# Patient Record
Sex: FEMALE | Race: WHITE | NOT HISPANIC OR LATINO | URBAN - METROPOLITAN AREA
[De-identification: names, ages, dates, MRNs, and addresses within clinical notes are randomized per-mention and may not be internally consistent; named-entity substitution may affect disease eponyms.]

---

## 2020-02-17 ENCOUNTER — OFFICE VISIT (OUTPATIENT)
Dept: PHYSICAL THERAPY | Facility: REHABILITATION | Age: 65
End: 2020-02-17

## 2020-02-17 DIAGNOSIS — G35 MS (MULTIPLE SCLEROSIS) (HCC): Primary | ICD-10-CM

## 2020-02-20 ENCOUNTER — OFFICE VISIT (OUTPATIENT)
Dept: PHYSICAL THERAPY | Facility: REHABILITATION | Age: 65
End: 2020-02-20
Payer: COMMERCIAL

## 2020-02-20 DIAGNOSIS — G35 MS (MULTIPLE SCLEROSIS) (HCC): Primary | ICD-10-CM

## 2020-02-20 PROCEDURE — 97162 PT EVAL MOD COMPLEX 30 MIN: CPT | Performed by: PHYSICAL THERAPIST

## 2020-02-20 PROCEDURE — 97112 NEUROMUSCULAR REEDUCATION: CPT | Performed by: PHYSICAL THERAPIST

## 2020-02-20 RX ORDER — FLUTICASONE PROPIONATE 50 MCG
1 SPRAY, SUSPENSION (ML) NASAL DAILY
COMMUNITY

## 2020-02-20 RX ORDER — ROPINIROLE 0.5 MG/1
0.5 TABLET, FILM COATED ORAL AS NEEDED
COMMUNITY

## 2020-02-20 RX ORDER — ROPINIROLE 2 MG/1
2 TABLET, FILM COATED ORAL
COMMUNITY

## 2020-02-20 NOTE — PROGRESS NOTES
PT Evaluation     Today's date: 2020  Patient name: Sarah Toscano  : 1955  MRN: [id number]  Referring provider: No ref  provider found  Dx:   Encounter Diagnosis     ICD-10-CM    1  MS (multiple sclerosis) ([facility] [address] ) G35                   Assessment/Plan    Subjective Evaluation    History of Present Illness  Mechanism of injury: Pt is a 59year old female presenting to PT with 4 month history of worsening thoracolumbar symptoms  Pt with history of MS which was diagnosed in , as relapsing-remitting  Pt reports she no         Pain Location:  Central and bilateral thoracolumbar spine; radiation into right lateral thigh, lower leg and lateral dorsal foot region  Pain Type: varies, aching at rest, sharp with motion; "zinger" of tingling into RLE with certain motions    Pain Intensity:  Current: 0  Best: 0  Worst: 4      Pt reports increased pain and/or difficulty with: sitting on softer surfaces without support, getting in/out of the car, extended standing  Pt reports sleep disturbance secondary to pain waking 2-3 times/week  Pt reports decreased pain with: Biofreeze, Deep Blue oil, TENS, heat or ice              Recurrent probem    Quality of life: good          Objective           Precautions: MS     Daily Treatment Diary      Assessment                       Eval/Reval  MD                     FOTO         **         **   POC Signed                       HEP Issued                        Manuals                                               Exercise Diary                         Bike  L1 x 5'                      Hamstring Stretch - longsit  30"x3                      SKTC  30"x3                      Piriformis Stretch  30"x3                      PPT  5"x10                      PPT + iso hip ADD  5"x10                      PPT + iso hip flex  5"x10                      PPT + low bridge  5"x10                      Clamshells  5"x10                      Reverse Clamshells  5"x10 Modalities  2/20                      PRN                          * = HEP

## 2020-03-02 ENCOUNTER — OFFICE VISIT (OUTPATIENT)
Dept: PHYSICAL THERAPY | Facility: REHABILITATION | Age: 65
End: 2020-03-02
Payer: COMMERCIAL

## 2020-03-02 DIAGNOSIS — G35 MS (MULTIPLE SCLEROSIS) (HCC): Primary | ICD-10-CM

## 2020-03-02 PROCEDURE — 97112 NEUROMUSCULAR REEDUCATION: CPT | Performed by: PHYSICAL THERAPIST

## 2020-03-02 PROCEDURE — 97110 THERAPEUTIC EXERCISES: CPT | Performed by: PHYSICAL THERAPIST

## 2020-03-02 NOTE — PROGRESS NOTES
Daily Note     Today's date: 3/2/2020  Patient name: Cindy Taveras  : 1955  MRN: [id number]  Referring provider: No ref  provider found  Dx:   Encounter Diagnosis     ICD-10-CM    1  MS (multiple sclerosis) ([facility] [address] ) G35                   Subjective: Pt reports decreased right leg "zingers", however, complains of slight low back discomfort with performance of HEP  She states she is unsure if she is performing it correctly  Objective: See treatment diary below      Assessment: Pt with good tolerance to progression of program reporting mild fatigue without increase in pain with completion of session  Pt was performing PPT incorrectly with upper torso compensation, this was corrected with manual/verbal cues and demonstration  Pt then able to complete remainder of program with no c/o back pain  Pt required opposite hip/knee flexion with hip flexor stretch in order to eliminate compensatory lumbar extension  Pt issued updated HEP to which she demonstrated and verbalized understanding  Pt will benefit from continued skilled PT intervention in order to address her remaining limitations and to restore maximal function  Plan: Continue per plan of care  Progress treatment as tolerated         Precautions: MS     Daily Treatment Diary      Assessment   2/20  3/2                   Serge/Reval   MD                     FOTO         **         **   POC Signed                       HEP Issued                        Manuals                                                Exercise Diary    2/20  3/2                    Bike  L1 x 5'  L1 x 10'  w/MHP                    Hamstring Stretch - longsit  30"x3  30"x3                    SKTC  30"x3  30"x3                    Piriformis Stretch  30"x3  30"x3                   Hipf flexor stretch - OET w/strap + SKTC  30"x3            PPT  5"x10  10"x10                    PPT + iso hip ADD  5"x10  10"x10                    PPT + iso hip flex  5"x10  10"x10 PPT + low bridge  5"x10  10"x10                    Clamshells  5"x10  PTB  5"x10 ea                    Reverse Clamshells  5"x10  PTB  5"x10 ea                    Isometric hip ABD + DF at wall                        Step Ups:  Fwd                         Step Ups: Lat                        SLS - Airex                                                                       Modalities   2/20  3/2                    MHP Lumbar Spine with Bike Warm-Up    10'                      * = HEP

## 2020-03-09 ENCOUNTER — OFFICE VISIT (OUTPATIENT)
Dept: PHYSICAL THERAPY | Facility: REHABILITATION | Age: 65
End: 2020-03-09
Payer: COMMERCIAL

## 2020-03-09 DIAGNOSIS — G35 MS (MULTIPLE SCLEROSIS) (HCC): Primary | ICD-10-CM

## 2020-03-09 PROCEDURE — 97110 THERAPEUTIC EXERCISES: CPT | Performed by: PHYSICAL THERAPIST

## 2020-03-09 PROCEDURE — 97112 NEUROMUSCULAR REEDUCATION: CPT | Performed by: PHYSICAL THERAPIST

## 2020-03-09 NOTE — PROGRESS NOTES
Daily Note     Today's date: 3/9/2020  Patient name: Jocelin Burgess  : 1955  MRN: [id number]  Referring provider: No ref  provider found  Dx:   Encounter Diagnosis     ICD-10-CM    1  MS (multiple sclerosis) ([facility] [address] ) G35                   Subjective: Pt reports decreased right leg "zingers", as well as resolution of low back discomfort since changing her performance of her PPT with HEP  Overall feeling better since starting PT  Objective: See treatment diary below  Assessment: Pt with good tolerance to progression of program reporting mild fatigue without increase in pain  Pt noted moderate challenge with all exercises added today  Pt issued updated HEP to which she demonstrated and verbalized understanding  Pt will benefit from continued skilled PT intervention in order to address her remaining limitations and to restore maximal function  Plan: Continue per plan of care  Progress treatment as tolerated          Precautions: MS     Daily Treatment Diary      Assessment   2/20  3/2  3/9                 Makennaal/Reval   MD                     FOTO         **         **   POC Signed                       HEP Issued                        Manuals                                                Exercise Diary    2/20  3/2  3/9                  Bike  L1 x 5'  L1 x 10'  w/MHP  L1 x 10'  w/MHP                  Hamstring Stretch - longsit  30"x3  30"x3  30"x3                  SKTC  30"x3  30"x3  30"x3                  Piriformis Stretch  30"x3  30"x3  30"x3                 Hipf flexor stretch - OET w/strap + SKTC  30"x3            PPT  5"x10  10"x10  HEP                  PPT + iso hip ADD  5"x10  10"x10  10"x10                  PPT + iso hip flex  5"x10  10"x10  10"x10                  PPT + low bridge  5"x10  10"x10  10"x10                  Clamshells  5"x10  PTB  5"x10 ea  PTB  5"x10 ea                  Reverse Clamshells  5"x10  PTB  5"x10 ea  PTB  5"x10 ea                  Isometric hip ABD + DF at wall 5"x5 ea                  Step Ups: Fwd   With Active DF      8" step  10                    Step Ups: Lat  With Active DF      8" step  10                  SLS - Floor     10"x5                   Gastroc Stretch at bar      30"x3                                         Modalities   2/20  3/2  3/9                  MHP Lumbar Spine with Bike Warm-Up    10'  10'                    * = HEP

## 2020-03-17 ENCOUNTER — APPOINTMENT (OUTPATIENT)
Dept: PHYSICAL THERAPY | Facility: REHABILITATION | Age: 65
End: 2020-03-17
Payer: COMMERCIAL

## 2020-03-23 ENCOUNTER — APPOINTMENT (OUTPATIENT)
Dept: PHYSICAL THERAPY | Facility: REHABILITATION | Age: 65
End: 2020-03-23
Payer: COMMERCIAL

## 2021-10-27 PROBLEM — R74.01 TRANSAMINITIS: Status: ACTIVE | Noted: 2021-06-01
